# Patient Record
Sex: MALE | Race: WHITE | NOT HISPANIC OR LATINO | URBAN - METROPOLITAN AREA
[De-identification: names, ages, dates, MRNs, and addresses within clinical notes are randomized per-mention and may not be internally consistent; named-entity substitution may affect disease eponyms.]

---

## 2023-06-14 ENCOUNTER — EMERGENCY (EMERGENCY)
Facility: HOSPITAL | Age: 23
LOS: 1 days | Discharge: HOME | End: 2023-06-14
Attending: EMERGENCY MEDICINE | Admitting: EMERGENCY MEDICINE
Payer: COMMERCIAL

## 2023-06-14 VITALS
SYSTOLIC BLOOD PRESSURE: 113 MMHG | TEMPERATURE: 98 F | HEART RATE: 105 BPM | OXYGEN SATURATION: 99 % | WEIGHT: 125 LBS | DIASTOLIC BLOOD PRESSURE: 73 MMHG | RESPIRATION RATE: 18 BRPM

## 2023-06-14 DIAGNOSIS — F84.0 AUTISTIC DISORDER: ICD-10-CM

## 2023-06-14 DIAGNOSIS — G40.909 EPILEPSY, UNSPECIFIED, NOT INTRACTABLE, WITHOUT STATUS EPILEPTICUS: ICD-10-CM

## 2023-06-14 DIAGNOSIS — Z91.010 ALLERGY TO PEANUTS: ICD-10-CM

## 2023-06-14 PROCEDURE — 99284 EMERGENCY DEPT VISIT MOD MDM: CPT

## 2023-06-14 RX ORDER — SODIUM CHLORIDE 9 MG/ML
1000 INJECTION INTRAMUSCULAR; INTRAVENOUS; SUBCUTANEOUS ONCE
Refills: 0 | Status: DISCONTINUED | OUTPATIENT
Start: 2023-06-14 | End: 2023-06-14

## 2023-06-14 NOTE — ED PROVIDER NOTE - CLINICAL SUMMARY MEDICAL DECISION MAKING FREE TEXT BOX
Pt w hx of autism and seizure d/o, on vimpat, presents with breakthrough seizure at work program earlier today without headstrike.  Mom at bedside states this is typical for pt and does not want any workup to be done, wants to go home.  On exam afebrile, VSS, well appearing, no focal neuro deficits.  FS wnl.  No e/o head trauma.  Discussed triggers for breakthrough seizures with mom who states they have been trying to figure out what his triggers are for years without success.  Offered labs/ workup to pt and mom who declined.  Pt appears to be at his baseline state of health.  Pt is stable for dc w mom with plan for neurology follow up.

## 2023-06-14 NOTE — ED ADULT NURSE NOTE - NSFALLUNIVINTERV_ED_ALL_ED
Bed/Stretcher in lowest position, wheels locked, appropriate side rails in place/Call bell, personal items and telephone in reach/Instruct patient to call for assistance before getting out of bed/chair/stretcher/Non-slip footwear applied when patient is off stretcher/[place] to call system/Physically safe environment - no spills, clutter or unnecessary equipment/Purposeful proactive rounding/Room/bathroom lighting operational, light cord in reach

## 2023-06-14 NOTE — ED ADULT NURSE NOTE - OBJECTIVE STATEMENT
23 y/o M here with 2 witnessed seizures while at a day program. Staff states the 2nd seizure lasted more than 3 mins so they administered 5mg IN of Versed. As per Staff the patient was in a sitting position and denies any head strike. FS in the field 120. arrives with school escort (Vitaly Lopez) h/o autism and epilepsy. A&Ox4 ambulatory.

## 2023-06-14 NOTE — ED PROVIDER NOTE - PHYSICAL EXAMINATION
Constitutional: awake and alert, in no acute distress  HEENT: head normocephalic and atraumatic. moist mucous membranes  Eyes: extraocular movements intact, normal conjunctiva  Neck: supple, normal ROM  Cardiovascular: regular rate   Pulmonary: no respiratory distress  Gastrointestinal: abdomen flat and nondistended  Skin: mild sunburn to back of neck and scalp  Musculoskeletal: no edema, no deformity  Neurological: oriented x4, no focal neurologic deficit.   Psychiatric: calm and cooperative

## 2023-06-14 NOTE — ED PROVIDER NOTE - PATIENT PORTAL LINK FT
You can access the FollowMyHealth Patient Portal offered by [facility] by registering at the following website: [email or web address]. By joining [employer]’s FollowMyHealth portal, you will also be able to view your health information using other applications (apps) compatible with our system.

## 2023-06-14 NOTE — ED ADULT NURSE NOTE - CHIEF COMPLAINT QUOTE
here for 2 witnessed seizures while at a day program. Staff states the 2nd seizure lasted more than 3 mins so they administered 5mg IN of Versed. As per Staff the patient was in a sitting position and denies any head strike. FS in the field 120. arrives with school escort (Vitaly Lopez) h/o autism and epilepsy

## 2023-06-14 NOTE — ED PROVIDER NOTE - OBJECTIVE STATEMENT
23yo M hx of autism and epilepsy, on vimpat, presents with breakthrough seizure.  Pt's mom states he had seizure at his work program, was given intranasal midazolam (NOT narcan- this was documented incorrectly in triage note), and staff is required to call EMS to bring him to hospital for evaluation after seizure.  Mom states pt has small seizures daily and has "larger" seizure approx 1x/ month.  States if pt had had this type of seizure at home, she would not have brought him in as this is a routine experience.  Pt has not missed any meds.  No change in sleep.  No recent illness.  No toxic ingestions.  Per mom, pt is behaving like normal self.  Pt denies HA, weakness, numbness, CP, SOB, or any other physical complaint.  No headstrike during event.  Pt has close follow up with his neurologist.

## 2023-06-14 NOTE — ED ADULT TRIAGE NOTE - CHIEF COMPLAINT QUOTE
here for 2 witnessed seizures while at a day program. Staff states the 2nd seizure lasted more than 3 mins so they administered 5mg IN of Narcan. As per Staff the patient was in a sitting position and denies any head strike. FS in the field 120. arrives with school escort (Vitaly Lopez) h/o autism and epilepsy here for 2 witnessed seizures while at a day program. Staff states the 2nd seizure lasted more than 3 mins so they administered 5mg IN of Versed. As per Staff the patient was in a sitting position and denies any head strike. FS in the field 120. arrives with school escort (Vitaly Lopez) h/o autism and epilepsy

## 2023-10-18 ENCOUNTER — EMERGENCY (EMERGENCY)
Facility: HOSPITAL | Age: 23
LOS: 1 days | Discharge: HOME | End: 2023-10-18
Admitting: EMERGENCY MEDICINE
Payer: COMMERCIAL

## 2023-10-18 VITALS
HEART RATE: 62 BPM | DIASTOLIC BLOOD PRESSURE: 71 MMHG | SYSTOLIC BLOOD PRESSURE: 108 MMHG | OXYGEN SATURATION: 95 % | RESPIRATION RATE: 16 BRPM | TEMPERATURE: 98 F

## 2023-10-18 VITALS
TEMPERATURE: 98 F | OXYGEN SATURATION: 95 % | HEIGHT: 66 IN | WEIGHT: 130.07 LBS | DIASTOLIC BLOOD PRESSURE: 76 MMHG | RESPIRATION RATE: 16 BRPM | HEART RATE: 90 BPM | SYSTOLIC BLOOD PRESSURE: 116 MMHG

## 2023-10-18 DIAGNOSIS — Z91.010 ALLERGY TO PEANUTS: ICD-10-CM

## 2023-10-18 DIAGNOSIS — R56.9 UNSPECIFIED CONVULSIONS: ICD-10-CM

## 2023-10-18 DIAGNOSIS — G40.909 EPILEPSY, UNSPECIFIED, NOT INTRACTABLE, WITHOUT STATUS EPILEPTICUS: ICD-10-CM

## 2023-10-18 DIAGNOSIS — F84.0 AUTISTIC DISORDER: ICD-10-CM

## 2023-10-18 PROCEDURE — 99284 EMERGENCY DEPT VISIT MOD MDM: CPT

## 2023-10-18 NOTE — ED PROVIDER NOTE - PATIENT PORTAL LINK FT
You can access the FollowMyHealth Patient Portal offered by [facility] by registering at the following website: [email or web address]. By joining [employer]’s FollowMyHealth portal, you will also be able to view your health information using other applications (apps) compatible with our system.

## 2023-10-18 NOTE — ED ADULT NURSE REASSESSMENT NOTE - NS ED NURSE REASSESS COMMENT FT1
mom at bedside had requested not to do blood work. KELSY Temple made aware and at bedside to assess and speak with pt and mom.

## 2023-10-18 NOTE — ED PROVIDER NOTE - CHIEF COMPLAINT
1.  Work on getting an extra 1 to 2 hours of sleep per night.  If you wake up naturally before your alarm goes off, do not go back to sleep.  Just plan to start your day.    2.  Have labs and x-ray done today.  Depending on those results, we may need a CT scan of your chest.    3.  Try Prilosec over-the-counter for the chest discomfort.    4.  It is time for colon cancer screening, this has been ordered for you today.   The patient is a 22y Male complaining of seizures.

## 2023-10-18 NOTE — ED ADULT NURSE NOTE - CCCP TRG CHIEF CMPLNT
Writer left message at  EKG/Holter dept to reach out to patient to schedule 48 hour holter. Informed patient that if he has not heard from them by Tues. November 7th to call me back and I will give another call to them to schedule.   
seizures

## 2023-10-18 NOTE — ED PROVIDER NOTE - OBJECTIVE STATEMENT
21yo M hx of autism and epilepsy, on vimpat now here for seizure while at day program. Per mother patient has multiple seizures per day and is at his baseline. Stating hit is not unusual for him to seize everyday. Patient is asymptomatic. Appearing well and vibrant. Denies any symptoms. Denies fever, chills, abdominal pain, change in bowel function, flank pain, rash, HA, dizziness, SOB, CP, palpitations, diaphoresis, cough, and malaise.

## 2023-10-18 NOTE — ED ADULT NURSE NOTE - OBJECTIVE STATEMENT
Family
carlos s/p seizure while at [facility] - mom with pt now. mom stating that he had received  5mg midazolam intranasal prior to arrival. pt reports reading book and then began shaking. upon ems arrival, pt states he remembers getting into the truck and everything was normal after. pt a&ox4 currently in NAD with no c/o pain. reports feeling exactly the same from post and prior to seizure. mom reports that pt appears his normal.

## 2023-10-18 NOTE — ED ADULT NURSE NOTE - CAS ELECT INFOMATION PROVIDED
mom verbalized understanding of follow up care and stated she was in contact with pt's neurologist already./Other

## 2023-10-18 NOTE — ED ADULT TRIAGE NOTE - CHIEF COMPLAINT QUOTE
Pt BIBA from autism center s/p seizures x3 this afternoon . PMH autism and seizures. Given 5mg midazolam PTA. Alert with VSS on arrival

## 2023-10-18 NOTE — ED PROVIDER NOTE - CLINICAL SUMMARY MEDICAL DECISION MAKING FREE TEXT BOX
Patient hx of epilepsy with daily seizures here with seizure while at day program. this is normal per mother. Patient is at baseline with baseline exam. Offered workup. Declined by patient and mother. She states he was sent in because the day program has to call EMS when this happens and they want to go home.   Patient denies any complaints

## 2025-04-02 ENCOUNTER — EMERGENCY (EMERGENCY)
Facility: HOSPITAL | Age: 25
LOS: 1 days | Discharge: HOME | End: 2025-04-02
Attending: EMERGENCY MEDICINE | Admitting: EMERGENCY MEDICINE
Payer: COMMERCIAL

## 2025-04-02 VITALS
SYSTOLIC BLOOD PRESSURE: 100 MMHG | HEART RATE: 73 BPM | RESPIRATION RATE: 20 BRPM | TEMPERATURE: 97 F | DIASTOLIC BLOOD PRESSURE: 59 MMHG | OXYGEN SATURATION: 100 %

## 2025-04-02 VITALS
OXYGEN SATURATION: 96 % | WEIGHT: 130.95 LBS | RESPIRATION RATE: 19 BRPM | SYSTOLIC BLOOD PRESSURE: 102 MMHG | TEMPERATURE: 97 F | DIASTOLIC BLOOD PRESSURE: 68 MMHG | HEART RATE: 86 BPM

## 2025-04-02 PROCEDURE — 99283 EMERGENCY DEPT VISIT LOW MDM: CPT

## 2025-04-02 NOTE — ED PROVIDER NOTE - OBJECTIVE STATEMENT
Patient mother reports that patient has a history of seizures.  Had 3 seizures at work today.  Sometimes has clusters of seizures like that.  He is compliant with his seizure medication.  Now feels back to baseline. No fever, ha, cp, sob, ap, n/v/d, focal weakness, paresthesias

## 2025-04-02 NOTE — ED ADULT TRIAGE NOTE - CHIEF COMPLAINT QUOTE
Pt BIBEMS from day program for persons w/ ASD for eval of 4- back to back witnessed seizures 9:36, 9:46,9:48, 9:50. After third seizure staff administered 5mg IN Versed. On arrival to ED pt at baseline. playing on phone. No tongue bite or incontinence. Paperwork from facility states that pt has 1-3 seizures daily. On Vimpat.

## 2025-04-02 NOTE — ED PROVIDER NOTE - NSTIMEPROVIDERCAREINITIATE_GEN_ER
Do you currently have any of the following:    Fever: No  Headache:  No  Cough: No  Shortness of breath: No  Exposed to anyone with these symptoms: No                                                                                                                Joanie Enamorado presents to the [address] on 3/8/2022. Koffi Owens is complaining of pain lower back. The pain is constant. The pain is described as just a pain. Pain is rated on his best day at a 4, on his worst day at a 10, and on average at a 6 on the VAS scale. He took his last dose of Percocet . Koffi Owens does not have issues with constipation. Any procedures since your last visit: No,      He is not on NSAIDS and  is not on anticoagulation medications to include none . Pacemaker or defibrillator: No.    Medication Contract and Consent for Opioid Use Documents Filed     Patient Documents     Type of Document Status Date Received Received By Description    Medication Contract Received 2/8/2022 12:19 PM SANCHEZ KENNEDY pain management                   BP (!) 140/86   Pulse 81   Temp 97.5 °F (36.4 °C) (Infrared)   Resp 16   Ht 5' 10\" (1.778 m)   Wt 150 lb (68 kg)   SpO2 93%   BMI 21.52 kg/m²      No LMP for male patient. 02-Apr-2025 11:19

## 2025-04-02 NOTE — ED PROVIDER NOTE - CLINICAL SUMMARY MEDICAL DECISION MAKING FREE TEXT BOX
With history of epilepsy had 3 seizures today.  Now back to baseline.  Discharged in company of mother

## 2025-04-02 NOTE — ED PROVIDER NOTE - PATIENT PORTAL LINK FT
You can access the FollowMyHealth Patient Portal offered by [facility] by registering at the following website: [email or web address]. By joining [employer]’s FollowMyHealth portal, you will also be able to view your health information using other applications (apps) compatible with our system.

## 2025-04-02 NOTE — ED PROVIDER NOTE - NSFOLLOWUPINSTRUCTIONS_ED_ALL_ED_FT
Seizure, Adult  A seizure is a sudden burst of abnormal activity in the brain. Seizures usually last from 30 seconds to 2 minutes.    There are many types of seizures. And they can cause many different symptoms.    What are the causes?  Common causes of a seizure include:  Fever or infection.  Problems that affect the brain. These may include:  A brain or head injury.  A stroke.  A brain tumor.  Low levels of blood sugar or salt.  Kidney problems or liver problems.  Some inherited conditions. These are passed down from parent to child.  Problems with a substance, such as:  Having a reaction to a drug or a medicine.  Stopping the use of a substance all of a sudden. When this causes problems, it's called withdrawal.  Disorders that affect how you develop, such as autism spectrum disorder or cerebral palsy.  Sometimes, the cause may not be known. Some people who have a seizure never have another one. A person who has repeated seizures over time without a clear cause has a condition called epilepsy.    What increases the risk?  Having a family history of epilepsy.  Having had a tonic–clonic seizure before. This type of seizure causes:  The muscles of the whole body to tighten, or contract.  Loss of consciousness.  Having a head injury or a stroke in the past.  Having had too little oxygen at birth.  What are the signs or symptoms?  The symptoms vary depending on the type of seizure you have.    Symptoms during a seizure    Having convulsions. This means shaking with fast, jerky movements of muscles.  Stiffness of the body.  Breathing problems.  Being confused.  Staring or not responding to sound or touch.  Head nodding, eye blinking, eye twitching, or fast eye movements.  Drooling, grunting, or making clicking sounds with your mouth.  Losing control of when you pee or poop.  Symptoms before a seizure    Feeling afraid, worried, or nervous.  Feeling like you may vomit.  Vertigo. This feels like:  You are moving when you're not.  Things around you are moving when they're not.  Déjà vu. This is a feeling of having seen or heard something before.  Odd tastes or smells.  Changes in how you see. You may see flashing lights or spots.  Symptoms after a seizure    Being confused.  Feeling sleepy.  Headache.  Sore muscles.  How is this diagnosed?  A seizure may be diagnosed based on:  A description of your symptoms. Video of your seizures can be helpful.  Your medical history.  A physical exam.  Tests, such as:  Blood tests.  CT scan.  MRI.  Electroencephalogram, or EEG. This test measures electrical activity in the brain.  A test of your spinal fluid. This is called a spinal tap or lumbar puncture.  How is this treated?  If your seizure stops on its own, you will not need treatment. If your seizure lasts longer than 5 minutes, you'll normally need treatment. This may include:  Medicines given through an IV.  Avoiding things, such as medicines, that are known to cause your seizures.  Medicines to prevent seizures. These are called antiepileptics.  A device to prevent or control seizures.  Eating foods that are low in carbohydrates and high in fat (ketogenic diet).  Surgery. This is sometimes needed if you keep having seizures.  Follow these instructions at home:  Medicines    Take your medicines only as told by your health care provider.  Avoid anything that may keep your medicine from working, such as alcohol.  Activity    Follow your provider's advice about driving, swimming, and doing other things that would be dangerous if you had a seizure. Wait until your provider says it's safe for you to do these things.  If you live in the [place] ask your local department of [employer] vehicles ([employer]) when you can drive.  Get enough rest and sleep. Not getting enough sleep can make seizures more likely to happen.  Teaching others    A person helping someone who is on the ground having a seizure. The helper carefully turns the person onto their side.  Teach friends and family what to do if you have a seizure. Tell them to:  Help you get down to the ground safely.  Protect your head and body.  Loosen any clothing around your neck.  Turn you on your side. This helps keep your airway clear if you vomit.  Know whether or not you need emergency care.  Stay with you until you are better.  Also, tell them what not to do if you have a seizure. Tell them:  They should not hold you down.  They should not put anything in your mouth.  General instructions    Avoid anything that has caused you to have seizures.  Keep a seizure diary. Write down:  What you remember about each seizure.  What you think might have caused each seizure.  Keep all follow-up visits. Your provider may need to monitor your progress.  Contact a health care provider if:  You have another seizure or seizures. Call each time you have a seizure.  You have a change in how often or when you have seizures.  You keep having seizures with treatment.  You have symptoms of being sick or having an infection.  You are not able to take your medicine.  Get help right away if:  You have or someone has seen you have:  A seizure that lasts longer than 5 minutes.  Many seizures in a row and you don't feel better between seizures.  A seizure that makes it harder to breathe.  A seizure that leaves you unable to speak or use a part of your body.  You didn't wake up right away after a seizure.  You injure yourself during a seizure.  You have confusion or pain right after a seizure.  These symptoms may be an emergency. Call 911 right away.  Do not wait to see if the symptoms will go away.  Do not drive yourself to the hospital.  This information is not intended to replace advice given to you by your health care provider. Make sure you discuss any questions you have with your health care provider.

## 2025-04-04 DIAGNOSIS — G40.909 EPILEPSY, UNSPECIFIED, NOT INTRACTABLE, WITHOUT STATUS EPILEPTICUS: ICD-10-CM

## 2025-04-04 DIAGNOSIS — R56.9 UNSPECIFIED CONVULSIONS: ICD-10-CM

## 2025-04-04 DIAGNOSIS — Z91.010 ALLERGY TO PEANUTS: ICD-10-CM
